# Patient Record
Sex: FEMALE | Race: OTHER
[De-identification: names, ages, dates, MRNs, and addresses within clinical notes are randomized per-mention and may not be internally consistent; named-entity substitution may affect disease eponyms.]

---

## 2017-05-14 ENCOUNTER — HOSPITAL ENCOUNTER (EMERGENCY)
Dept: HOSPITAL 62 - ER | Age: 22
Discharge: HOME | End: 2017-05-14
Payer: OTHER GOVERNMENT

## 2017-05-14 VITALS — DIASTOLIC BLOOD PRESSURE: 89 MMHG | SYSTOLIC BLOOD PRESSURE: 138 MMHG

## 2017-05-14 DIAGNOSIS — E66.01: ICD-10-CM

## 2017-05-14 DIAGNOSIS — R20.2: ICD-10-CM

## 2017-05-14 DIAGNOSIS — R29.818: Primary | ICD-10-CM

## 2017-05-14 PROCEDURE — 99283 EMERGENCY DEPT VISIT LOW MDM: CPT

## 2017-05-14 NOTE — ER DOCUMENT REPORT
ED General





- General


Chief Complaint: Leg Pain


Stated Complaint: LEFT LEG PAIN


Time Seen by Provider: 05/14/17 01:24


Notes: 


Patient is a 21-year-old female past medical history of morbid obesity who 

presents with 3 weeks of intermittent pain in her left lower extremity.  States 

the pain radiates from her buttock down the lateral aspect of her thigh and to 

the lateral aspect of her knee.  Describes it as a tingling, burning pain.  

States it is triggered after she is standing for long a time at work and 

resolves when she is able to sit down.  She denies any bowel or bladder 

incontinence, urinary retention, fever, or trauma to the back.  No prior 

history of this except in the last 3 weeks.  She has not seen her primary care 

doctor regarding today's concerns.





- Related Data


Allergies/Adverse Reactions: 


 





No Known Allergies Allergy (Unverified 05/14/17 00:49)


 











Past Medical History





- General


Information source: Patient





- Social History


Smoking Status: Never Smoker


Frequency of alcohol use: None


Drug Abuse: None


Lives with: Spouse/Significant other


Family History: Reviewed & Not Pertinent


Renal/ Medical History: Denies: Hx Peritoneal Dialysis


Surgical Hx: Negative





Review of Systems





- Review of Systems


Notes: 


Constitutional: Negative for fever.


HENT: Negative for sore throat.


Eyes: Negative for visual changes.


Cardiovascular: Negative for chest pain.


Respiratory: Negative for shortness of breath.


Gastrointestinal: Negative for abdominal pain, vomiting or diarrhea.


Genitourinary: Negative for dysuria.


Musculoskeletal: Negative for back pain.  Positive for left leg pain


Skin: Negative for rash.


Neurological: Negative for headaches, weakness or numbness.





10 point ROS negative except as marked above and in HPI.





Physical Exam





- Vital signs


Vitals: 


 











Temp Pulse Resp BP Pulse Ox


 


 98.4 F   79   16   132/82 H  98 


 


 05/14/17 00:49  05/14/17 00:49  05/14/17 00:49  05/14/17 00:49  05/14/17 00:49











Interpretation: Normal


Notes: 


PHYSICAL EXAMINATION:





GENERAL: Well-appearing, well-nourished and in no acute distress.





HEAD: Atraumatic, normocephalic.





EYES: Pupils equal round and reactive to light, extraocular movements intact, 

sclera anicteric, conjunctiva are normal.





ENT: nares patent, oropharynx clear without exudates.  Moist mucous membranes.





NECK: Normal range of motion, supple without lymphadenopathy





LUNGS: Breath sounds clear to auscultation bilaterally and equal.  No wheezes 

rales or rhonchi.





HEART: Regular rate and rhythm without murmurs





ABDOMEN: Soft, nontender, normoactive bowel sounds.  No guarding, no rebound.  

No masses appreciated.





EXTREMITIES: Normal range of motion, no pitting or edema.  No cyanosis.  

Positive left straight leg test.





Back: No focal midline spinal tenderness or step-offs.  No deformities.





NEUROLOGICAL: 5 out of 5 strength both distally and proximally bilateral lower 

extremities.  2+ patellar reflexes bilaterally.  No clonus.  Sensation grossly 

intact in the bilateral lower extremities.  Patient is able to ambulate without 

difficulty.





PSYCH: Normal mood, normal affect.





SKIN: Warm, Dry, normal turgor, no rashes or lesions noted.





Course





- Re-evaluation


Re-evalutation: 





05/14/17 01:51


Patient presents with symptoms consistent with sciatic nerve root irritation on 

the left.  She has a positive left-sided straight leg test.  The pain radiates 

down the buttock and along the lateral aspect of the leg.  She has no focal 

back pain.  No bowel or bladder incontinence.  No urinary retention.  She is 

able to ambulate without difficulty.  No signs or symptoms consistent with an 

acute DVT.  There is no edema in either leg and the circumference of the legs 

is equal bilaterally.  I have encouraged slow, gradual weight loss as the 

patient is 131 kg and I believe this is the main component of why she is having 

pain today.  I have conveyed this patient encouraged her to begin diet and 

exercise.At this time will discharge with return precautions and follow-up 

recommendations.  Verbal discharge instructions given a the bedside and 

opportunity for questions given. Medication warnings reviewed. Patient is in 

agreement with this plan and has verbalized understanding of return precautions 

and the need for primary care follow-up in the next 24-72 hours.





- Vital Signs


Vital signs: 


 











Temp Pulse Resp BP Pulse Ox


 


 98.4 F   72   18   138/89 H  98 


 


 05/14/17 00:49  05/14/17 02:00  05/14/17 02:00  05/14/17 02:00  05/14/17 02:00














Discharge





- Discharge


Clinical Impression: 


 Irritation of left sciatic nerve





Condition: Good


Disposition: HOME, SELF-CARE


Additional Instructions: 


Please take ibuprofen 600 mg every 6 hours with food or milk for the next 1 

week.  Begin back and leg stretching exercises to assist with your symptoms.  

As we discussed, please also work towards a gradual weight loss along the lines 

of 3-4 pounds a month.  Often the easiest way to do this is by cutting out 

sugary beverages from your diet.  Return if you develop urinary incontinence, 

becomes unable to see, difficulty walking, develop focal weakness or numbness, 

or have any other symptoms that are worrisome to you.


Forms:  Return to Work

## 2019-03-05 ENCOUNTER — HOSPITAL ENCOUNTER (OUTPATIENT)
Dept: HOSPITAL 62 - OD | Age: 24
End: 2019-03-05
Attending: FAMILY MEDICINE
Payer: OTHER GOVERNMENT

## 2019-03-05 DIAGNOSIS — E66.01: Primary | ICD-10-CM

## 2019-03-05 LAB
ADD MANUAL DIFF: NO
ALBUMIN SERPL-MCNC: 4.6 G/DL (ref 3.5–5)
ALP SERPL-CCNC: 123 U/L (ref 38–126)
ALT SERPL-CCNC: 26 U/L (ref 9–52)
ANION GAP SERPL CALC-SCNC: 11 MMOL/L (ref 5–19)
AST SERPL-CCNC: 19 U/L (ref 14–36)
BASOPHILS # BLD AUTO: 0.1 10^3/UL (ref 0–0.2)
BASOPHILS NFR BLD AUTO: 0.8 % (ref 0–2)
BILIRUB DIRECT SERPL-MCNC: 0.1 MG/DL (ref 0–0.4)
BILIRUB SERPL-MCNC: 0.6 MG/DL (ref 0.2–1.3)
BUN SERPL-MCNC: 9 MG/DL (ref 7–20)
CALCIUM: 9.9 MG/DL (ref 8.4–10.2)
CHLORIDE SERPL-SCNC: 105 MMOL/L (ref 98–107)
CO2 SERPL-SCNC: 25 MMOL/L (ref 22–30)
EOSINOPHIL # BLD AUTO: 0.1 10^3/UL (ref 0–0.6)
EOSINOPHIL NFR BLD AUTO: 0.6 % (ref 0–6)
ERYTHROCYTE [DISTWIDTH] IN BLOOD BY AUTOMATED COUNT: 13 % (ref 11.5–14)
GLUCOSE SERPL-MCNC: 86 MG/DL (ref 75–110)
HCT VFR BLD CALC: 41.8 % (ref 36–47)
HGB BLD-MCNC: 14.2 G/DL (ref 12–15.5)
LYMPHOCYTES # BLD AUTO: 3.2 10^3/UL (ref 0.5–4.7)
LYMPHOCYTES NFR BLD AUTO: 26.1 % (ref 13–45)
MCH RBC QN AUTO: 28.9 PG (ref 27–33.4)
MCHC RBC AUTO-ENTMCNC: 34.1 G/DL (ref 32–36)
MCV RBC AUTO: 85 FL (ref 80–97)
MONOCYTES # BLD AUTO: 0.8 10^3/UL (ref 0.1–1.4)
MONOCYTES NFR BLD AUTO: 6.3 % (ref 3–13)
NEUTROPHILS # BLD AUTO: 8.1 10^3/UL (ref 1.7–8.2)
NEUTS SEG NFR BLD AUTO: 66.2 % (ref 42–78)
PLATELET # BLD: 521 10^3/UL (ref 150–450)
POTASSIUM SERPL-SCNC: 4.4 MMOL/L (ref 3.6–5)
PROT SERPL-MCNC: 8.2 G/DL (ref 6.3–8.2)
RBC # BLD AUTO: 4.93 10^6/UL (ref 3.72–5.28)
SODIUM SERPL-SCNC: 140.8 MMOL/L (ref 137–145)
TOTAL CELLS COUNTED % (AUTO): 100 %
WBC # BLD AUTO: 12.3 10^3/UL (ref 4–10.5)

## 2019-03-05 PROCEDURE — 36415 COLL VENOUS BLD VENIPUNCTURE: CPT

## 2019-03-05 PROCEDURE — 93005 ELECTROCARDIOGRAM TRACING: CPT

## 2019-03-05 PROCEDURE — 93010 ELECTROCARDIOGRAM REPORT: CPT

## 2019-03-05 PROCEDURE — 71046 X-RAY EXAM CHEST 2 VIEWS: CPT

## 2019-03-05 PROCEDURE — 80053 COMPREHEN METABOLIC PANEL: CPT

## 2019-03-05 PROCEDURE — 85025 COMPLETE CBC W/AUTO DIFF WBC: CPT

## 2019-03-05 PROCEDURE — 84443 ASSAY THYROID STIM HORMONE: CPT

## 2019-03-05 NOTE — RADIOLOGY REPORT (SQ)
EXAM DESCRIPTION:  CHEST PA/LATERAL



COMPLETED DATE/TIME:  3/5/2019 12:42 pm



REASON FOR STUDY:  MORBID (SEVERE) OBESITY DUE TO EXCESS CALORIES



COMPARISON:  None.



EXAM PARAMETERS:  NUMBER OF VIEWS: two views

TECHNIQUE: Digital Frontal and Lateral radiographic views of the chest acquired.

RADIATION DOSE: NA

LIMITATIONS: none



FINDINGS:  LUNGS AND PLEURA: No opacities, masses or pneumothorax. No pleural effusion.

MEDIASTINUM AND HILAR STRUCTURES: No masses or contour abnormalities.

HEART AND VASCULAR STRUCTURES: Heart normal size.  No evidence for failure.

BONES: No acute findings.

HARDWARE: None in the chest.

OTHER: No other significant finding.



IMPRESSION:  NO SIGNIFICANT RADIOGRAPHIC FINDING IN THE CHEST.



TECHNICAL DOCUMENTATION:  JOB ID:  [id number]

 2011 [employer]- All Rights Reserved



Reading location - IP/workstation name: [email or web address]

## 2019-04-26 ENCOUNTER — HOSPITAL ENCOUNTER (OUTPATIENT)
Dept: HOSPITAL 62 - WI | Age: 24
End: 2019-04-26
Attending: SURGERY
Payer: OTHER GOVERNMENT

## 2019-04-26 DIAGNOSIS — E66.01: ICD-10-CM

## 2019-04-26 DIAGNOSIS — Z01.818: Primary | ICD-10-CM

## 2019-04-26 PROCEDURE — 76705 ECHO EXAM OF ABDOMEN: CPT

## 2019-04-26 NOTE — RADIOLOGY REPORT (SQ)
EXAM DESCRIPTION:  U/S ABDOMEN LIMITED W/O DOP



COMPLETED DATE/TIME:  4/26/2019 9:02 am



REASON FOR STUDY:  E66.01 MORBID (SEVERE) OBESITY DUE TO EXCESS CALORIES Z01.818  ENCOUNTER FOR OTHER
 PREPROCEDURAL EXAMINATION E66.01  MORBID (SEVERE) OBESITY DUE TO EXCESS CALORIES



COMPARISON:  None.



TECHNIQUE:  Dynamic and static grayscale images acquired of the abdomen and recorded on PACS. Additio
nal selected color Doppler and spectral images recorded.



LIMITATIONS:  The examination is limited due to body habitus.



FINDINGS:  PANCREAS: No masses.  Visualized pancreatic duct normal caliber.

LIVER:  The liver measures 18.5 cm in length, mildly prominent.  Fatty liver.

LIVER VASCULATURE: Normal directional flow of the main portal vein and hepatic veins.

GALLBLADDER: No stones.  The gallbladder wall measures 1.7 mm, normal wall thickness. No pericholecys
tic fluid.

ULTRASOUND-DETECTED LONGORIA'S SIGN: Negative.

INTRAHEPATIC DUCTS AND COMMON DUCT: CBD measures 4.2 mm in diameter, normal.  The  intrahepatic ducts
 normal caliber. No filling defects.

INFERIOR VENA CAVA: Normal flow.

AORTA: No aneurysm.

RIGHT KIDNEY:  The right kidney measures 11.6 cm in length, normal size. Normal echogenicity. No rajwinder
d or suspicious masses. No hydronephrosis. No calcifications.

PERITONEAL AND RIGHT PLEURAL SPACE: No ascites or effusions.

OTHER: No other significant findings.



IMPRESSION:  1.  Fatty liver.  Hepatomegaly.

2.  Examination is otherwise unremarkable sonographically.



TECHNICAL DOCUMENTATION:  JOB ID:  [id number]

 2011 [employer]- All Rights Reserved



Reading location - IP/workstation name: [email or web address]